# Patient Record
Sex: FEMALE | Race: WHITE | URBAN - METROPOLITAN AREA
[De-identification: names, ages, dates, MRNs, and addresses within clinical notes are randomized per-mention and may not be internally consistent; named-entity substitution may affect disease eponyms.]

---

## 2019-10-21 ENCOUNTER — OFFICE VISIT - RIVER FALLS (OUTPATIENT)
Dept: FAMILY MEDICINE | Facility: CLINIC | Age: 19
End: 2019-10-21

## 2019-10-21 ASSESSMENT — MIFFLIN-ST. JEOR: SCORE: 1204.93

## 2019-10-24 ENCOUNTER — COMMUNICATION - RIVER FALLS (OUTPATIENT)
Dept: FAMILY MEDICINE | Facility: CLINIC | Age: 19
End: 2019-10-24

## 2022-02-11 VITALS
DIASTOLIC BLOOD PRESSURE: 68 MMHG | HEART RATE: 67 BPM | WEIGHT: 131.6 LBS | OXYGEN SATURATION: 98 % | HEIGHT: 56 IN | SYSTOLIC BLOOD PRESSURE: 110 MMHG | BODY MASS INDEX: 29.6 KG/M2

## 2022-02-16 NOTE — NURSING NOTE
Comprehensive Intake Entered On:  10/21/2019 2:59 PM CDT    Performed On:  10/21/2019 2:54 PM CDT by Wendi Pak CMA               Summary   Chief Complaint :   Pt here for pre emp px   Weight Measured :   131.6 lb(Converted to: 131 lb 10 oz, 59.69 kg)    Height Measured :   56 in(Converted to: 4 ft 8 in, 142.24 cm)    Body Mass Index :   29.5 kg/m2 (HI)    Body Surface Area :   1.53 m2   Systolic Blood Pressure :   110 mmHg   Diastolic Blood Pressure :   68 mmHg   Mean Arterial Pressure :   82 mmHg   Peripheral Pulse Rate :   67 bpm   Oxygen Saturation :   98 %   Wendi Pak CMA - 10/21/2019 2:54 PM CDT   Health Status   Allergies Verified? :   Yes   Medication History Verified? :   Yes   Medical History Verified? :   Yes   Pre-Visit Planning Status :   N/A   Tobacco Use? :   Never smoker   Wendi Pak CMA - 10/21/2019 2:54 PM CDT   Consents   Consent for Immunization Exchange :   Consent Granted   Consent for Immunizations to Providers :   Consent Granted   Wendi Pak CMA - 10/21/2019 2:54 PM CDT   Meds / Allergies   (As Of: 10/21/2019 2:59:43 PM CDT)   Allergies (Active)   No Known Medication Allergies  Estimated Onset Date:   Unspecified ; Created By:   Wendi Sands CMA; Reaction Status:   Active ; Category:   Drug ; Substance:   No Known Medication Allergies ; Type:   Allergy ; Updated By:   Wendi Sands CMA; Reviewed Date:   10/21/2019 2:57 PM CDT        Medication List   (As Of: 10/21/2019 2:59:43 PM CDT)   Home Meds    cetirizine  :   cetirizine ; Status:   Processing ; Ordered As Mnemonic:   ZyrTEC ; Action Display:   Complete ; Catalog Code:   cetirizine ; Order Dt/Tm:   10/21/2019 2:57:49 PM CDT          ethinyl estradiol-norgestimate  :   ethinyl estradiol-norgestimate ; Status:   Documented ; Ordered As Mnemonic:   Sprintec 0.25 mg-35 mcg oral tablet ; Simple Display Line:   1 tab(s), po, daily, 0 Refill(s) ; Catalog Code:   ethinyl estradiol-norgestimate ; Order Dt/Tm:    5/24/2016 10:00:47 AM CHARLYT

## 2022-02-16 NOTE — NURSING NOTE
PPD Reading POC Entered On:  10/24/2019 8:44 AM CDT    Performed On:  10/24/2019 8:44 AM CDT by Wendi Pak CMA               PPD Reading   PPD mm of Induration :   0 mm   PPD Interpretation :   Negative   Wendi Pak CMA - 10/24/2019 8:44 AM CDT

## 2022-02-16 NOTE — NURSING NOTE
PPD Administration POC Entered On:  10/21/2019 3:20 PM CDT    Performed On:  10/21/2019 3:00 PM CDT by Wendi Pak CMA               PPD Administration   PPD Insertion Site :   Left forearm   PPD Amount Administered (mL) :   0.1 mL   Wendi Pak CMA - 10/21/2019 3:19 PM CDT   Details   Collection Date :   10/21/2019 3:00 PM CDT   Expiration Date :   5/11/2021 CDT   Lot#/Manufacture :   [id number]   Handling Specimen POC :   Tubersol    :   Responsive Sports   POC Test Comments :   pt instructed to have read in 48-72 hours.. Pt voiced understanding   Wendi Pak CMA - 10/21/2019 3:19 PM CDT